# Patient Record
Sex: MALE | Race: WHITE
[De-identification: names, ages, dates, MRNs, and addresses within clinical notes are randomized per-mention and may not be internally consistent; named-entity substitution may affect disease eponyms.]

---

## 2019-01-13 ENCOUNTER — HOSPITAL ENCOUNTER (EMERGENCY)
Dept: HOSPITAL 11 - JP.ED | Age: 54
Discharge: HOME | End: 2019-01-13
Payer: COMMERCIAL

## 2019-01-13 DIAGNOSIS — F30.8: Primary | ICD-10-CM

## 2019-01-13 DIAGNOSIS — Z88.8: ICD-10-CM

## 2019-01-13 NOTE — EDM.PDOC
<Brant Perry - Last Filed: 01/13/19 18:45>





ED HPI GENERAL MEDICAL PROBLEM





- General


Chief Complaint: Neuro Symptoms/Deficits


Stated Complaint: SENSATION IN BACK OF NECK


Time Seen by Provider: 01/13/19 17:15


Source of Information: Reports: Patient, Family





- History of Present Illness


INITIAL COMMENTS - FREE TEXT/NARRATIVE: 


53-year-old male without known significant medical problems presents with 

concerns of bizarre behavior. Very difficult to obtain history from the patient 

due to tangential and grandiose thinking. His family accompanies him today. By 

report of the family he was relatively well functioning while living in the 

company of his mother who passed away last months. Since this time they've 

noticed an increase in nonsensical thinking. He repetitively writes in his 

bilateral. He reportedly went to the train station in [place] to  

a girlfriend who does not exist. When I talk with him he is perseverating 

around the death of his mother and her eulogy. He has no history of psychiatric 

disease. He is on no medications.





  ** Headache


Pain Score (Numeric/FACES): 2





- Related Data


 Allergies











Allergy/AdvReac Type Severity Reaction Status Date / Time


 


cetirizine [From Zyrtec] Allergy  Other Verified 01/13/19 16:26











Home Meds: 


 Home Meds





NK [No Known Home Meds]  01/13/19 [History]











Past Medical History


HEENT History: Reports: Impaired Vision


Gastrointestinal History: Reports: None


Oncologic (Cancer) History: Reports: Basal Cell Carcinoma





- Infectious Disease History


Infectious Disease History: Reports: Chicken Pox





- Past Surgical History


Head Surgeries/Procedures: Reports: None


HEENT Surgical History: Reports: None


GI Surgical History: Reports: Cholecystectomy


Oncologic Surgical History: Reports: None





Social & Family History





- Tobacco Use


Smoking Status *Q: Never Smoker


Second Hand Smoke Exposure: No





- Caffeine Use


Caffeine Use: Reports: Coffee





- Recreational Drug Use


Recreational Drug Use: No





ED ROS GENERAL





- Review of Systems


Review Of Systems: See Below


Constitutional: Reports: No Symptoms


HEENT: Reports: No Symptoms


Respiratory: Reports: No Symptoms


Cardiovascular: Reports: No Symptoms


Endocrine: Reports: No Symptoms


GI/Abdominal: Reports: No Symptoms


: Reports: No Symptoms


Musculoskeletal: Reports: No Symptoms


Skin: Reports: No Symptoms


Neurological: Reports: No Symptoms


Psychiatric: Reports: Other (bizzarre behavior)


Immunologic: Reports: No Symptoms





ED EXAM, NEURO





- Physical Exam


Exam: See Below


Exam Limited By: No Limitations


General Appearance: Alert


Ears: Normal External Exam


Nose: Normal Inspection


Throat/Mouth: Normal Inspection


Head Exam: Atraumatic, Normocephalic


Respiratory/Chest: No Respiratory Distress


Cardiovascular: Regular Rate, Rhythm


GI/Abdominal: Normal Bowel Sounds, Soft, Non-Tender


Neurological: Alert, Normal Mood/Affect, CN II-XII Intact


Extremities: Normal Inspection


Psychiatric: Other (Grandiose and tangential thoughts, does report the voice of 

God in his head.)


Skin Exam: Warm, Dry





Course





- Vital Signs


Last Recorded V/S: 





 Last Vital Signs











Temp  35.9 C   01/13/19 16:29


 


Pulse  99   01/13/19 17:53


 


Resp  16   01/13/19 17:39


 


BP  170/92 H  01/13/19 17:53


 


Pulse Ox  98   01/13/19 17:53














- Orders/Labs/Meds


Orders: 





 Active Orders 24 hr











 Category Date Time Status


 


 Head wo Cont [CT] Stat Exams  01/13/19 17:48 Taken











Labs: 





 Laboratory Tests











  01/13/19 01/13/19 Range/Units





  18:03 18:03 


 


WBC  9.1   (4.5-11.0)  K/uL


 


RBC  4.77   (4.30-5.90)  M/uL


 


Hgb  16.0 H   (12.0-15.0)  g/dL


 


Hct  46.0   (40.0-54.0)  %


 


MCV  96   (80-98)  fL


 


MCH  34 H   (27-31)  pg


 


MCHC  35   (32-36)  %


 


Plt Count  237   (150-400)  K/uL


 


Sodium   139 L  (140-148)  mmol/L


 


Potassium   4.0  (3.6-5.2)  mmol/L


 


Chloride   101  (100-108)  mmol/L


 


Carbon Dioxide   28  (21-32)  mmol/L


 


Anion Gap   14.0  (5.0-14.0)  mmol/L


 


BUN   10  (7-18)  mg/dL


 


Creatinine   0.8  (0.8-1.3)  mg/dL


 


Est Cr Clr Drug Dosing   114.51  mL/min


 


Estimated GFR (MDRD)   > 60  (>60)  


 


Glucose   107 H  ()  mg/dL


 


Calcium   9.6  (8.5-10.1)  mg/dL














- Re-Assessments/Exams


Free Text/Narrative Re-Assessment/Exam: 


53-year-old presents with bizarre behavior including auditory hallucinations of 

God, grandiose thinking, tangential thoughts.


Report he does not have a history of psychiatric disease. Much of his behavior 

abnormalities of the, parents since the death of his mother one month ago, 

question whether she was covering for much of his possible mental issues.


He denies any suicidal or homicidal ideation. I do not believe that he is 

dangerous to self or others.


Obtained a CT of the head and basic screening labs are unremarkable.


He is not meningitic or encephalopathic, LP does not seem warranted


I have offered to have crisis team evaluate him for possible underlying mental 

health disease, patient and family amendable to this.


I believe he is ultimately safe to go home if crisis agrees.


He was signed out to the oncoming MD at end of shift. 


01/13/19 18:53








Departure





- Departure


Disposition: Home, Self-Care 01


Clinical Impression: 


 Hypomania








- Discharge Information


Referrals: 


PCP,None [Primary Care Provider] - 


Forms:  ED Department Discharge


Additional Instructions: 


Follow-up with the counselor as planned. Your free to return to the emergency 

department at any time if needed.





<Vinod Núñez - Last Filed: 01/13/19 22:41>





Course





- Re-Assessments/Exams


Free Text/Narrative Re-Assessment/Exam: 





01/13/19 22:39


This man has been evaluated by the crisis counselor and she agrees that he does 

not need hospitalization. That was Dr. Perry's impression also. The counselor 

is setting up outpatient counseling for the patient





Departure





- Departure


Time of Disposition: 22:41


Condition: Fair